# Patient Record
Sex: FEMALE | URBAN - METROPOLITAN AREA
[De-identification: names, ages, dates, MRNs, and addresses within clinical notes are randomized per-mention and may not be internally consistent; named-entity substitution may affect disease eponyms.]

---

## 2019-10-02 ENCOUNTER — APPOINTMENT (OUTPATIENT)
Age: 39
Setting detail: DERMATOLOGY
End: 2019-10-07

## 2019-10-02 DIAGNOSIS — L53.8 OTHER SPECIFIED ERYTHEMATOUS CONDITIONS: ICD-10-CM

## 2019-10-02 DIAGNOSIS — L20.89 OTHER ATOPIC DERMATITIS: ICD-10-CM

## 2019-10-02 PROBLEM — L20.84 INTRINSIC (ALLERGIC) ECZEMA: Status: ACTIVE | Noted: 2019-10-02

## 2019-10-02 PROCEDURE — OTHER OTHER (SELF PAY): OTHER

## 2019-10-02 PROCEDURE — OTHER PRESCRIPTION: OTHER

## 2019-10-02 PROCEDURE — OTHER COUNSELING: OTHER

## 2019-10-02 PROCEDURE — OTHER ADDITIONAL NOTES: OTHER

## 2019-10-02 PROCEDURE — OTHER REASSURANCE: OTHER

## 2019-10-02 RX ORDER — MOMETASONE FUROATE 1 MG/G
CREAM TOPICAL
Qty: 1 | Refills: 2 | Status: ERX | COMMUNITY
Start: 2019-10-02

## 2019-10-02 RX ORDER — HYDROXYZINE HYDROCHLORIDE 25 MG/1
TABLET, FILM COATED ORAL
Qty: 60 | Refills: 1 | Status: ERX | COMMUNITY
Start: 2019-10-02

## 2019-10-02 NOTE — PROCEDURE: REASSURANCE
Detail Level: Detailed
Additional Notes (Optional): Recommended gentle emollient skin care with Cetaphil body wash and cream.\\nRecommended Cicalfate twice a day for rash where skin is open.\\nMix Mometasome cream with Cicalfate at bedtime and apply to rash.
Hide Additional Notes?: No

## 2019-10-02 NOTE — HPI: RASH
What Type Of Note Output Would You Prefer (Optional)?: Standard Output
Is This A New Presentation, Or A Follow-Up?: Rash
Additional History: Pt received a puncture wound on her left lower inner ankle around [place] and area was healing but then became infected in July right before rash started. She has been treated multiple times with antibiotics and prednisone. States steroids help the most but symptoms always return about a week after completing Rx

## 2020-01-02 ENCOUNTER — APPOINTMENT (OUTPATIENT)
Age: 40
Setting detail: DERMATOLOGY
End: 2020-01-03

## 2020-01-02 DIAGNOSIS — L30.0 NUMMULAR DERMATITIS: ICD-10-CM

## 2020-01-02 PROBLEM — L30.9 DERMATITIS, UNSPECIFIED: Status: ACTIVE | Noted: 2020-01-02

## 2020-01-02 PROCEDURE — OTHER PRESCRIPTION: OTHER

## 2020-01-02 PROCEDURE — OTHER COUNSELING: OTHER

## 2020-01-02 PROCEDURE — 99213 OFFICE O/P EST LOW 20 MIN: CPT

## 2020-01-02 RX ORDER — CRISABOROLE 20 MG/G
OINTMENT TOPICAL
Qty: 1 | Refills: 2 | Status: ERX | COMMUNITY
Start: 2020-01-02

## 2020-01-02 RX ORDER — MOMETASONE FUROATE 1 MG/G
OINTMENT TOPICAL
Qty: 1 | Refills: 2 | Status: ERX | COMMUNITY
Start: 2020-01-02

## 2020-01-02 RX ORDER — TRIAMCINOLONE ACETONIDE 1 MG/G
CREAM TOPICAL
Qty: 1 | Refills: 2 | Status: ERX | COMMUNITY
Start: 2020-01-02

## 2020-01-24 ENCOUNTER — RX ONLY (RX ONLY)
Age: 40
End: 2020-01-24

## 2020-01-24 RX ORDER — PIMECROLIMUS 10 MG/G
CREAM TOPICAL
Qty: 1 | Refills: 2 | Status: ERX | COMMUNITY
Start: 2020-01-23

## 2020-07-29 ENCOUNTER — APPOINTMENT (OUTPATIENT)
Dept: URBAN - METROPOLITAN AREA CLINIC 272 | Age: 40
Setting detail: DERMATOLOGY
End: 2020-07-29

## 2020-07-29 DIAGNOSIS — B35.3 TINEA PEDIS: ICD-10-CM

## 2020-07-29 PROCEDURE — OTHER PRESCRIPTION: OTHER

## 2020-07-29 PROCEDURE — OTHER ADDITIONAL NOTES: OTHER

## 2020-07-29 PROCEDURE — 99213 OFFICE O/P EST LOW 20 MIN: CPT

## 2020-07-29 RX ORDER — KETOCONAZOLE 20 MG/G
CREAM TOPICAL
Qty: 1 | Refills: 3 | Status: ERX | COMMUNITY
Start: 2020-07-29

## 2020-08-12 ENCOUNTER — APPOINTMENT (OUTPATIENT)
Dept: URBAN - METROPOLITAN AREA CLINIC 272 | Age: 40
Setting detail: DERMATOLOGY
End: 2020-08-12

## 2020-08-12 DIAGNOSIS — Z79.899 OTHER LONG TERM (CURRENT) DRUG THERAPY: ICD-10-CM

## 2020-08-12 DIAGNOSIS — B35.3 TINEA PEDIS: ICD-10-CM

## 2020-08-12 PROCEDURE — 99213 OFFICE O/P EST LOW 20 MIN: CPT

## 2020-08-12 PROCEDURE — OTHER PRESCRIPTION: OTHER

## 2020-08-12 PROCEDURE — OTHER ORDER TESTS: OTHER

## 2020-08-12 PROCEDURE — OTHER ADDITIONAL NOTES: OTHER

## 2020-08-12 RX ORDER — TERBINAFINE HYDROCHLORIDE 250 MG/1
TABLET ORAL
Qty: 30 | Refills: 0 | Status: ERX | COMMUNITY
Start: 2020-08-12

## 2020-08-24 ENCOUNTER — HOSPITAL ENCOUNTER (EMERGENCY)
Dept: HOSPITAL 11 - JP.ED | Age: 40
Discharge: HOME | End: 2020-08-24
Payer: OTHER GOVERNMENT

## 2020-08-24 DIAGNOSIS — L50.0: Primary | ICD-10-CM

## 2020-08-24 PROCEDURE — 96375 TX/PRO/DX INJ NEW DRUG ADDON: CPT

## 2020-08-24 PROCEDURE — 99284 EMERGENCY DEPT VISIT MOD MDM: CPT

## 2020-08-24 PROCEDURE — 96372 THER/PROPH/DIAG INJ SC/IM: CPT

## 2020-08-24 PROCEDURE — 96374 THER/PROPH/DIAG INJ IV PUSH: CPT

## 2020-08-24 NOTE — EDM.PDOC
ED HPI GENERAL MEDICAL PROBLEM





- General


Stated Complaint: REACTION TO SOMETHING


Time Seen by Provider: 08/24/20 17:31


Source of Information: Reports: Patient


History Limitations: Reports: No Limitations





- History of Present Illness


INITIAL COMMENTS - FREE TEXT/NARRATIVE: 


Misty is a 39 year old female, presents to the ED today with c/o allergic 

reaction, unsure to what.  Patient was hiking around [facility] when 

symptoms started including sob, eye swelling, hives and itching.  Patient denies

any allergic reactions like this in the past, denies any known insect bites 

prior to arrival.  Patient has taken no medications, nothing makes her symptoms 

better or worse. Patient is on an oral anti-fungal for the last week, unsure 

what the name is for fungal infection to her feet. 


Onset: Today, Sudden





- Related Data


                                    Allergies











Allergy/AdvReac Type Severity Reaction Status Date / Time


 


No Known Allergies Allergy   Verified 08/24/20 17:55














ED ROS ALLERGIC REACTION





- Review of Systems


Review Of Systems: Comprehensive ROS is negative, except as noted in HPI.





ED EXAM GENERAL NO PERIP PULSE





- Physical Exam


Exam: See Below


Exam Limited By: No Limitations


General Appearance: Alert, WD/WN, No Apparent Distress, Anxious


Eye Exam: Bilateral Eye: EOMI, PERRL


Ears: Normal External Exam


Nose: Normal Inspection, Normal Mucosa


Throat/Mouth: Normal Inspection, Normal Oropharynx, Normal Voice, No Airway 

Compromise


Head: Atraumatic, Facial Swelling


Neck: Normal Inspection, Supple, Non-Tender


Respiratory/Chest: No Respiratory Distress, Lungs Clear, Normal Breath Sounds, 

No Accessory Muscle Use


Cardiovascular: Normal Peripheral Pulses, Regular Rate, Rhythm


GI/Abdominal: Normal Bowel Sounds, Soft, Non-Tender


Back Exam: Normal Inspection


Extremities: Normal Inspection


Neurological: Alert, Oriented


Psychiatric: Anxious


Skin Exam: Other (Urticarial rash, lids swollen nearly shut bilaterally)


Lymphatic: No Adenopathy





Course





- Vital Signs


Text/Narrative:: 


1745-Patient arrived to ED with allergic reaction to unknown substance.


Patient with significant eyelid swelling, hives, and c/o sob.  She is not whe

ezing, hx of asthma, no stridor, handling secretions without difficulty. 


Patient on anti-fungal for the last week otherwise nothing new, no exposures to 

insects that she is aware of.  


Patient given IM epinephrine, IV Benadryl, IV Pepcid and IV solu-medrol. 





Patient feeling better, rash and swelling/hives/redness have all improved and I 

feel patient is able to be discharged home, no rebound symptoms.   Will cover 

with a 5 day course of prednisone to be started tomorrow morning.  Zyrtec or 

Claritin daily for 5 days.  Benadryl as needed. Epi-pens prescribed as needed.  

Recommend patient stop the oral anti-fungal she is on and follow up with regard 

to this with the prescribing provider as this may be what contributed to her 

allergic reaction today.





Reasons to return to the ED discussed patient and family, patient agreeable and 

discharged in stable condition with significant other driving. 


Last Recorded V/S: 


                                Last Vital Signs











Temp  36.8 C   08/24/20 18:26


 


Pulse  75   08/24/20 18:26


 


Resp  19   08/24/20 18:26


 


BP  124/78   08/24/20 18:15


 


Pulse Ox  100   08/24/20 18:26














- Orders/Labs/Meds


Orders: 


                               Active Orders 24 hr











 Category Date Time Status


 


 Peripheral IV Care [RC] .AS DIRECTED Care  08/24/20 17:36 Active


 


 Sodium Chloride 0.9% [Saline Flush] Med  08/24/20 17:36 Active





 10 ml FLUSH ASDIRECTED PRN   


 


 Peripheral IV Insertion Adult [OM.PC] Routine Oth  08/24/20 17:36 Ordered








                                Medication Orders





Sodium Chloride (Saline Flush)  10 ml FLUSH ASDIRECTED PRN


   PRN Reason: Keep Vein Open


   Last Admin: 08/24/20 18:49  Dose: 10 ml


   Documented by: DANILO








Meds: 


Medications











Generic Name Dose Route Start Last Admin





  Trade Name Freq  PRN Reason Stop Dose Admin


 


Sodium Chloride  10 ml  08/24/20 17:36  08/24/20 18:49





  Saline Flush  FLUSH   10 ml





  ASDIRECTED PRN   Administration





  Keep Vein Open  














Discontinued Medications














Generic Name Dose Route Start Last Admin





  Trade Name Freq  PRN Reason Stop Dose Admin


 


Diphenhydramine HCl  50 mg  08/24/20 17:36  08/24/20 17:50





  Benadryl  IVPUSH  08/24/20 17:37  50 mg





  ONETIME ONE   Administration


 


Epinephrine HCl  0.3 mg  08/24/20 17:36  08/24/20 17:49





  Adrenalin  IM  08/24/20 17:37  0.3 mg





  ONETIME ONE   Administration


 


Famotidine  20 mg  08/24/20 17:36  08/24/20 17:50





  Pepcid  IVPUSH  08/24/20 17:37  20 mg





  ONETIME ONE   Administration


 


Hydrocortisone Sodium Succinate  125 mg  08/24/20 17:37 





  Solu-Cortef  IVPUSH  08/24/20 17:38 





  ONETIME ONE  


 


Sodium Chloride  1,000 mls @ 999 mls/hr  08/24/20 17:37  08/24/20 17:50





  Normal Saline  IV  08/24/20 18:37  999 mls/hr





  .BOLUS ONE   Administration


 


Methylprednisolone Sodium Succinate  125 mg  08/24/20 17:49  08/24/20 17:51





  Solu-Medrol  IVPUSH  08/24/20 17:50  125 mg





  ONETIME ONE   Administration














Departure





- Departure


Time of Disposition: 20:00


Disposition: Home, Self-Care 01


Condition: Good


Clinical Impression: 


Allergic reaction caused by a drug


Qualifiers:


 Encounter type: initial encounter Qualified Code(s): T78.40XA - Allergy, 

unspecified, initial encounter








- Discharge Information


Instructions:  Anaphylactic Reaction, Adult, How to Use an Auto-Injector Pen


Referrals: 


PCP,None [Primary Care Provider] - 


Additional Instructions: 


Start oral prednisone tomorrow morning.


Benadryl 25 mg every 4-6 hours as needed for itching/hives


Zyrtec or Claritin daily for the next 5 days.


Pepcid 20 mg daily for 5 days.


Epi-pen as needed per package instructions.


Please stop the anti-fungal medication you are taking for your feet and follow 

up with the prescribing provider regarding this.


Return here with any worsening symptoms.








Sepsis Event Note (ED)





- Focused Exam


Vital Signs: 


                                   Vital Signs











  Temp Pulse Pulse Resp BP Pulse Ox


 


 08/24/20 18:26  36.8 C  75   19   100


 


 08/24/20 18:15  37.2 C  65  63  15  121/79  100


 


 08/24/20 18:06   65   15  121/79  100


 


 08/24/20 17:56  37.2 C   63  12  124/78  99


 


 08/24/20 17:49   68   16  124/78  99


 


 08/24/20 17:46   69   15  154/85 H  99


 


 08/24/20 17:35   72   15  145/91 H  99














- My Orders


Last 24 Hours: 


My Active Orders





08/24/20 17:36


Peripheral IV Care [RC] .AS DIRECTED 


Sodium Chloride 0.9% [Saline Flush]   10 ml FLUSH ASDIRECTED PRN 


Peripheral IV Insertion Adult [OM.PC] Routine 














- Assessment/Plan


Last 24 Hours: 


My Active Orders





08/24/20 17:36


Peripheral IV Care [RC] .AS DIRECTED 


Sodium Chloride 0.9% [Saline Flush]   10 ml FLUSH ASDIRECTED PRN 


Peripheral IV Insertion Adult [OM.PC] Routine

## 2020-09-01 ENCOUNTER — APPOINTMENT (OUTPATIENT)
Dept: URBAN - METROPOLITAN AREA CLINIC 272 | Age: 40
Setting detail: DERMATOLOGY
End: 2020-09-07

## 2020-09-01 ENCOUNTER — RX ONLY (RX ONLY)
Age: 40
End: 2020-09-01

## 2020-09-01 DIAGNOSIS — B35.3 TINEA PEDIS: ICD-10-CM

## 2020-09-01 PROCEDURE — OTHER PRESCRIPTION: OTHER

## 2020-09-01 PROCEDURE — OTHER COUNSELING: OTHER

## 2020-09-01 PROCEDURE — 99213 OFFICE O/P EST LOW 20 MIN: CPT

## 2020-09-01 PROCEDURE — OTHER ADDITIONAL NOTES: OTHER

## 2020-09-01 RX ORDER — EPINEPHRINE 0.3 MG/.3ML
INJECTION INTRAMUSCULAR
Qty: 1 | Refills: 0 | Status: ERX | COMMUNITY
Start: 2020-09-01

## 2020-09-01 NOTE — PROCEDURE: ADDITIONAL NOTES
Detail Level: Simple
Additional Notes: Patient had anaphylactic reaction to terbinafine and had to go to the ER. EpiPen prescribed

## 2020-09-02 ENCOUNTER — RX ONLY (RX ONLY)
Age: 40
End: 2020-09-02

## 2020-09-02 RX ORDER — KETOCONAZOLE 20 MG/G
CREAM TOPICAL
Qty: 1 | Refills: 3 | Status: ERX

## 2020-09-02 RX ORDER — CICLOPIROX 80 MG/ML
SOLUTION TOPICAL
Qty: 1 | Refills: 2 | Status: ERX | COMMUNITY
Start: 2020-09-02

## 2020-09-02 RX ORDER — EPINEPHRINE 0.3 MG/.3ML
INJECTION INTRAMUSCULAR
Qty: 1 | Refills: 0 | Status: ERX

## 2020-09-02 RX ORDER — CICLOPIROX 80 MG/ML
SOLUTION TOPICAL
Qty: 1 | Refills: 2 | Status: ERX

## 2021-01-07 ENCOUNTER — RX ONLY (RX ONLY)
Age: 41
End: 2021-01-07

## 2021-01-07 ENCOUNTER — APPOINTMENT (OUTPATIENT)
Dept: URBAN - METROPOLITAN AREA CLINIC 272 | Age: 41
Setting detail: DERMATOLOGY
End: 2021-01-10

## 2021-01-07 DIAGNOSIS — L20.89 OTHER ATOPIC DERMATITIS: ICD-10-CM

## 2021-01-07 DIAGNOSIS — D485 NEOPLASM OF UNCERTAIN BEHAVIOR OF SKIN: ICD-10-CM

## 2021-01-07 PROBLEM — D48.5 NEOPLASM OF UNCERTAIN BEHAVIOR OF SKIN: Status: ACTIVE | Noted: 2021-01-07

## 2021-01-07 PROCEDURE — 99212 OFFICE O/P EST SF 10 MIN: CPT | Mod: 25

## 2021-01-07 PROCEDURE — OTHER BIOPSY BY SHAVE METHOD: OTHER

## 2021-01-07 PROCEDURE — OTHER ADDITIONAL NOTES: OTHER

## 2021-01-07 PROCEDURE — 11102 TANGNTL BX SKIN SINGLE LES: CPT

## 2021-01-07 PROCEDURE — OTHER COUNSELING: OTHER

## 2021-01-07 RX ORDER — TRIAMCINOLONE ACETONIDE 1 MG/G
CREAM TOPICAL
Qty: 1 | Refills: 6 | Status: ERX

## 2021-01-07 RX ORDER — PIMECROLIMUS 10 MG/G
CREAM TOPICAL
Qty: 1 | Refills: 6 | Status: ERX

## 2021-01-07 ASSESSMENT — LOCATION DETAILED DESCRIPTION DERM: LOCATION DETAILED: RIGHT INFERIOR MEDIAL MIDBACK

## 2021-01-07 ASSESSMENT — LOCATION SIMPLE DESCRIPTION DERM: LOCATION SIMPLE: RIGHT LOWER BACK

## 2021-01-07 ASSESSMENT — LOCATION ZONE DERM: LOCATION ZONE: TRUNK

## 2021-01-07 NOTE — PROCEDURE: ADDITIONAL NOTES
Render Risk Assessment In Note?: yes
Additional Notes: Well controlled with medication.
Detail Level: Simple

## 2021-01-07 NOTE — PROCEDURE: BIOPSY BY SHAVE METHOD
Validate Anticipated Plan: No
Biopsy Type: H and E
Post-Care Instructions: I reviewed with the patient in detail post-care instructions. Patient is to keep the biopsy site dry overnight, and then apply bacitracin twice daily until healed. Patient may apply hydrogen peroxide soaks to remove any crusting.
Wound Care: Petrolatum
Type Of Destruction Used: Curettage
Electrodesiccation Text: The wound bed was treated with electrodesiccation after the biopsy was performed.
X Size Of Lesion In Cm: 0
Information: Selecting Yes will display possible errors in your note based on the variables you have selected. This validation is only offered as a suggestion for you. PLEASE NOTE THAT THE VALIDATION TEXT WILL BE REMOVED WHEN YOU FINALIZE YOUR NOTE. IF YOU WANT TO FAX A PRELIMINARY NOTE YOU WILL NEED TO TOGGLE THIS TO 'NO' IF YOU DO NOT WANT IT IN YOUR FAXED NOTE.
Depth Of Biopsy: dermis
Hemostasis: Electrocautery
Cryotherapy Text: The wound bed was treated with cryotherapy after the biopsy was performed.
Anesthesia Volume In Cc (Will Not Render If 0): 0.5
Size Of Lesion In Cm: 1
Silver Nitrate Text: The wound bed was treated with silver nitrate after the biopsy was performed.
Dressing: bandage
Anesthesia Type: 1% lidocaine without epinephrine
Consent: Written consent was obtained and risks were reviewed including but not limited to scarring, infection, bleeding, scabbing, incomplete removal, nerve damage and allergy to anesthesia.
Curettage Text: The wound bed was treated with curettage after the biopsy was performed.
Detail Level: Detailed
Electrodesiccation And Curettage Text: The wound bed was treated with electrodesiccation and curettage after the biopsy was performed.
Validate Note Data (See Information Below): Yes
Notification Instructions: Patient will be notified of biopsy results. However, patient instructed to call the office if not contacted within 2 weeks.
Biopsy Method: Personna blade
Billing Type: Third-Party Bill